# Patient Record
Sex: FEMALE | Race: WHITE | NOT HISPANIC OR LATINO
[De-identification: names, ages, dates, MRNs, and addresses within clinical notes are randomized per-mention and may not be internally consistent; named-entity substitution may affect disease eponyms.]

---

## 2019-08-23 ENCOUNTER — APPOINTMENT (OUTPATIENT)
Dept: VASCULAR SURGERY | Facility: CLINIC | Age: 64
End: 2019-08-23
Payer: MEDICARE

## 2019-08-23 PROCEDURE — 99204 OFFICE O/P NEW MOD 45 MIN: CPT

## 2019-09-13 ENCOUNTER — APPOINTMENT (OUTPATIENT)
Dept: VASCULAR SURGERY | Facility: CLINIC | Age: 64
End: 2019-09-13
Payer: MEDICARE

## 2019-09-13 PROCEDURE — 99213 OFFICE O/P EST LOW 20 MIN: CPT

## 2019-09-18 PROBLEM — Z00.00 ENCOUNTER FOR PREVENTIVE HEALTH EXAMINATION: Status: ACTIVE | Noted: 2019-09-18

## 2019-10-23 ENCOUNTER — RX RENEWAL (OUTPATIENT)
Age: 64
End: 2019-10-23

## 2019-10-23 RX ORDER — ALPRAZOLAM 0.5 MG/1
0.5 TABLET ORAL
Qty: 2 | Refills: 0 | Status: ACTIVE | COMMUNITY
Start: 2019-10-23 | End: 1900-01-01

## 2019-10-28 ENCOUNTER — APPOINTMENT (OUTPATIENT)
Dept: VASCULAR SURGERY | Facility: CLINIC | Age: 64
End: 2019-10-28
Payer: MEDICARE

## 2019-10-28 VITALS — HEIGHT: 62 IN | BODY MASS INDEX: 32.2 KG/M2 | WEIGHT: 175 LBS

## 2019-10-28 PROCEDURE — 36475 ENDOVENOUS RF 1ST VEIN: CPT | Mod: RT

## 2019-10-28 NOTE — ADDENDUM
[FreeTextEntry1] : Patient tolerated the procedure well. She verbalized understanding of the post procedure instructions. She does not take Motrin. She understands that Motrin is given to decrease post procedure inflammation.  and will take Tylenol for pain.

## 2019-10-28 NOTE — PROCEDURE
[FreeTextEntry1] : RFA right GSV [FreeTextEntry2] : symptomatic venous insufficiency [FreeTextEntry3] : The patient was seen in the waiting room where the consent was obtained. She was then taken to the procedure room where a timeout was called to verify her identity and the laterality of the procedure. The patient's right leg was then interrogated under ultrasound and an appropriate place for cannulation was identified. Her right leg was then prepped and draped in the standard fashion. Under ultrasound guidance the patient was given an injection of 1% plain lidocaine in the proximal calf. Access was then obtained with a 7 FR sheath in the standard fashion. Catheter was placed to the SFJ and withdrawn 3.0 cm from the junction. Patient was then given tumescence around the saphenous vein under ultrasound guidance. The ablation was then performed in the standard fashion. Steri strips were applied to catheter insertion site. Leg was wrapped in kerlix and an ace wrap. Patient was discharged in stable condition.\par

## 2019-11-04 ENCOUNTER — APPOINTMENT (OUTPATIENT)
Dept: VASCULAR SURGERY | Facility: CLINIC | Age: 64
End: 2019-11-04
Payer: MEDICARE

## 2019-11-04 VITALS — HEART RATE: 74 BPM | SYSTOLIC BLOOD PRESSURE: 130 MMHG | DIASTOLIC BLOOD PRESSURE: 73 MMHG

## 2019-11-04 DIAGNOSIS — Z86.79 PERSONAL HISTORY OF OTHER DISEASES OF THE CIRCULATORY SYSTEM: ICD-10-CM

## 2019-11-04 DIAGNOSIS — M32.9 SYSTEMIC LUPUS ERYTHEMATOSUS, UNSPECIFIED: ICD-10-CM

## 2019-11-04 DIAGNOSIS — Z78.9 OTHER SPECIFIED HEALTH STATUS: ICD-10-CM

## 2019-11-04 DIAGNOSIS — Z86.39 PERSONAL HISTORY OF OTHER ENDOCRINE, NUTRITIONAL AND METABOLIC DISEASE: ICD-10-CM

## 2019-11-04 PROCEDURE — 99213 OFFICE O/P EST LOW 20 MIN: CPT

## 2019-11-05 PROBLEM — Z86.79 HISTORY OF ESSENTIAL HYPERTENSION: Status: RESOLVED | Noted: 2019-11-05 | Resolved: 2019-11-05

## 2019-11-05 PROBLEM — M32.9 HISTORY OF SYSTEMIC LUPUS ERYTHEMATOSUS (SLE): Status: RESOLVED | Noted: 2019-11-05 | Resolved: 2019-11-05

## 2019-11-05 PROBLEM — Z78.9 NEVER EXERCISES: Status: ACTIVE | Noted: 2019-11-05

## 2019-11-05 PROBLEM — Z86.39 HISTORY OF HYPOTHYROIDISM: Status: RESOLVED | Noted: 2019-11-05 | Resolved: 2019-11-05

## 2019-11-05 PROBLEM — Z86.39 HISTORY OF DIABETES MELLITUS: Status: RESOLVED | Noted: 2019-11-05 | Resolved: 2019-11-05

## 2019-11-05 RX ORDER — OMEPRAZOLE 20 MG/1
TABLET, DELAYED RELEASE ORAL
Refills: 0 | Status: ACTIVE | COMMUNITY

## 2019-11-05 RX ORDER — HYDROXYCHLOROQUINE SULFATE 200 MG/1
TABLET ORAL
Refills: 0 | Status: ACTIVE | COMMUNITY

## 2019-11-05 RX ORDER — LEVOTHYROXINE SODIUM 137 UG/1
TABLET ORAL
Refills: 0 | Status: ACTIVE | COMMUNITY

## 2019-11-05 RX ORDER — HYDROCHLOROTHIAZIDE 12.5 MG/1
TABLET ORAL
Refills: 0 | Status: ACTIVE | COMMUNITY

## 2019-11-05 NOTE — HISTORY OF PRESENT ILLNESS
[FreeTextEntry1] : 65 yo female s/p a right GSV RFA. She reports that she is doing well. She reports tenderness at the catheter insertion site. She denies fever or chills. She did not take Motrin post procedure.

## 2019-11-05 NOTE — PHYSICAL EXAM
[JVD] : no jugular venous distention  [Normal Breath Sounds] : Normal breath sounds [Normal Rate and Rhythm] : normal rate and rhythm [2+] : right 2+ [Ankle Swelling (On Exam)] : not present [Ankle Swelling On The Right] : of the right ankle [Varicose Veins Of Lower Extremities] : present [Varicose Veins Of The Right Leg] : of the right leg [Ankle Swelling On The Left] : moderate [] : of the right leg [Alert] : alert [Oriented to Person] : oriented to person [Oriented to Place] : oriented to place [Oriented to Time] : oriented to time [de-identified] : Awake and Alert [de-identified] : no ecchymosis, no eryhema [de-identified] : appropriate

## 2019-11-05 NOTE — ASSESSMENT
[FreeTextEntry1] : 64 year female doing well s/p RFA. She will return to her  daily activities as tolerated. She will follow up in several weeks, sooner if she  develops a problem.\par \par

## 2019-11-18 ENCOUNTER — APPOINTMENT (OUTPATIENT)
Dept: VASCULAR SURGERY | Facility: CLINIC | Age: 64
End: 2019-11-18
Payer: MEDICARE

## 2019-11-18 DIAGNOSIS — I83.93 ASYMPTOMATIC VARICOSE VEINS OF BILATERAL LOWER EXTREMITIES: ICD-10-CM

## 2019-11-18 DIAGNOSIS — I87.2 VENOUS INSUFFICIENCY (CHRONIC) (PERIPHERAL): ICD-10-CM

## 2019-11-18 PROCEDURE — 93971 EXTREMITY STUDY: CPT

## 2019-11-18 PROCEDURE — 99213 OFFICE O/P EST LOW 20 MIN: CPT

## 2019-11-18 NOTE — HISTORY OF PRESENT ILLNESS
[FreeTextEntry1] : 65 yo female s/p a right GSV RFA. She reports that she is doing well. She reports continued tenderness of the medial knee. She reports that the tenderness is slowly improving. She has continued spider veins and would like to discuss additional treatment options.

## 2019-11-18 NOTE — PHYSICAL EXAM
[JVD] : no jugular venous distention  [Normal Breath Sounds] : Normal breath sounds [Normal Rate and Rhythm] : normal rate and rhythm [2+] : right 2+ [Ankle Swelling (On Exam)] : not present [Ankle Swelling On The Right] : of the right ankle [Varicose Veins Of Lower Extremities] : present [Varicose Veins Of The Right Leg] : of the right leg [Ankle Swelling On The Left] : moderate [] : of the right leg [Alert] : alert [Oriented to Person] : oriented to person [Oriented to Place] : oriented to place [Oriented to Time] : oriented to time [de-identified] : Awake and Alert [de-identified] : no ecchymosis, no erythema, area of induration at the knee [de-identified] : appropriate

## 2019-11-18 NOTE — ASSESSMENT
[FreeTextEntry1] : 64 year female s/p RFA. She has some continued induration of the vein at the level of the knee. She reports that the pain is slowly decreasing. She continues to have a significant amount of spider veins and I think she is an excellent candidate for sclerotherapy. The risks and benefits of sclerotherapy were discussed with the patient who agrees to proceed. \par \par

## 2022-06-05 ENCOUNTER — TRANSCRIPTION ENCOUNTER (OUTPATIENT)
Age: 67
End: 2022-06-05

## 2022-06-06 ENCOUNTER — APPOINTMENT (OUTPATIENT)
Dept: DISASTER EMERGENCY | Facility: HOSPITAL | Age: 67
End: 2022-06-06

## 2023-07-23 ENCOUNTER — TRANSCRIPTION ENCOUNTER (OUTPATIENT)
Age: 68
End: 2023-07-23